# Patient Record
Sex: MALE | Race: WHITE | Employment: UNEMPLOYED | URBAN - METROPOLITAN AREA
[De-identification: names, ages, dates, MRNs, and addresses within clinical notes are randomized per-mention and may not be internally consistent; named-entity substitution may affect disease eponyms.]

---

## 2021-05-01 ENCOUNTER — APPOINTMENT (EMERGENCY)
Dept: CT IMAGING | Facility: HOSPITAL | Age: 31
End: 2021-05-01
Payer: COMMERCIAL

## 2021-05-01 ENCOUNTER — HOSPITAL ENCOUNTER (EMERGENCY)
Facility: HOSPITAL | Age: 31
Discharge: HOME | End: 2021-05-01
Attending: EMERGENCY MEDICINE | Admitting: EMERGENCY MEDICINE
Payer: COMMERCIAL

## 2021-05-01 VITALS
TEMPERATURE: 97.7 F | HEART RATE: 77 BPM | RESPIRATION RATE: 18 BRPM | SYSTOLIC BLOOD PRESSURE: 127 MMHG | OXYGEN SATURATION: 98 % | WEIGHT: 170 LBS | DIASTOLIC BLOOD PRESSURE: 84 MMHG

## 2021-05-01 DIAGNOSIS — K21.00 REFLUX ESOPHAGITIS: ICD-10-CM

## 2021-05-01 DIAGNOSIS — K29.00 ACUTE GASTRITIS: Primary | ICD-10-CM

## 2021-05-01 DIAGNOSIS — R11.2 NAUSEA AND VOMITING: ICD-10-CM

## 2021-05-01 LAB
ALBUMIN SERPL BCP-MCNC: 4.3 G/DL (ref 3.5–5)
ALP SERPL-CCNC: 72 U/L (ref 46–116)
ALT SERPL W P-5'-P-CCNC: 29 U/L (ref 12–78)
ANION GAP SERPL CALCULATED.3IONS-SCNC: 10 MMOL/L (ref 4–13)
AST SERPL W P-5'-P-CCNC: 20 U/L (ref 5–45)
BASOPHILS # BLD AUTO: 0.02 THOUSANDS/ΜL (ref 0–0.1)
BASOPHILS NFR BLD AUTO: 0 % (ref 0–1)
BILIRUB DIRECT SERPL-MCNC: 0.13 MG/DL (ref 0–0.2)
BILIRUB SERPL-MCNC: 0.37 MG/DL (ref 0.2–1)
BUN SERPL-MCNC: 20 MG/DL (ref 5–25)
CALCIUM SERPL-MCNC: 8.6 MG/DL (ref 8.3–10.1)
CHLORIDE SERPL-SCNC: 100 MMOL/L (ref 100–108)
CO2 SERPL-SCNC: 28 MMOL/L (ref 21–32)
CREAT SERPL-MCNC: 0.91 MG/DL (ref 0.6–1.3)
EOSINOPHIL # BLD AUTO: 0.04 THOUSAND/ΜL (ref 0–0.61)
EOSINOPHIL NFR BLD AUTO: 0 % (ref 0–6)
ERYTHROCYTE [DISTWIDTH] IN BLOOD BY AUTOMATED COUNT: 11.8 % (ref 11.6–15.1)
GFR SERPL CREATININE-BSD FRML MDRD: 112 ML/MIN/1.73SQ M
GLUCOSE SERPL-MCNC: 117 MG/DL (ref 65–140)
HCT VFR BLD AUTO: 44.7 % (ref 36.5–49.3)
HGB BLD-MCNC: 15.3 G/DL (ref 12–17)
IMM GRANULOCYTES # BLD AUTO: 0.04 THOUSAND/UL (ref 0–0.2)
IMM GRANULOCYTES NFR BLD AUTO: 0 % (ref 0–2)
LACTATE SERPL-SCNC: 1 MMOL/L (ref 0.5–2)
LIPASE SERPL-CCNC: 145 U/L (ref 73–393)
LYMPHOCYTES # BLD AUTO: 1.07 THOUSANDS/ΜL (ref 0.6–4.47)
LYMPHOCYTES NFR BLD AUTO: 9 % (ref 14–44)
MCH RBC QN AUTO: 30.1 PG (ref 26.8–34.3)
MCHC RBC AUTO-ENTMCNC: 34.2 G/DL (ref 31.4–37.4)
MCV RBC AUTO: 88 FL (ref 82–98)
MONOCYTES # BLD AUTO: 0.41 THOUSAND/ΜL (ref 0.17–1.22)
MONOCYTES NFR BLD AUTO: 3 % (ref 4–12)
NEUTROPHILS # BLD AUTO: 10.87 THOUSANDS/ΜL (ref 1.85–7.62)
NEUTS SEG NFR BLD AUTO: 88 % (ref 43–75)
NRBC BLD AUTO-RTO: 0 /100 WBCS
PLATELET # BLD AUTO: 235 THOUSANDS/UL (ref 149–390)
PMV BLD AUTO: 10.7 FL (ref 8.9–12.7)
POTASSIUM SERPL-SCNC: 3.7 MMOL/L (ref 3.5–5.3)
PROT SERPL-MCNC: 8.1 G/DL (ref 6.4–8.2)
RBC # BLD AUTO: 5.08 MILLION/UL (ref 3.88–5.62)
SODIUM SERPL-SCNC: 138 MMOL/L (ref 136–145)
WBC # BLD AUTO: 12.45 THOUSAND/UL (ref 4.31–10.16)

## 2021-05-01 PROCEDURE — 99285 EMERGENCY DEPT VISIT HI MDM: CPT | Performed by: PHYSICIAN ASSISTANT

## 2021-05-01 PROCEDURE — 99284 EMERGENCY DEPT VISIT MOD MDM: CPT

## 2021-05-01 PROCEDURE — 96376 TX/PRO/DX INJ SAME DRUG ADON: CPT

## 2021-05-01 PROCEDURE — 80076 HEPATIC FUNCTION PANEL: CPT | Performed by: PHYSICIAN ASSISTANT

## 2021-05-01 PROCEDURE — 96375 TX/PRO/DX INJ NEW DRUG ADDON: CPT

## 2021-05-01 PROCEDURE — 36415 COLL VENOUS BLD VENIPUNCTURE: CPT | Performed by: PHYSICIAN ASSISTANT

## 2021-05-01 PROCEDURE — 83605 ASSAY OF LACTIC ACID: CPT | Performed by: PHYSICIAN ASSISTANT

## 2021-05-01 PROCEDURE — 85025 COMPLETE CBC W/AUTO DIFF WBC: CPT | Performed by: PHYSICIAN ASSISTANT

## 2021-05-01 PROCEDURE — 80048 BASIC METABOLIC PNL TOTAL CA: CPT | Performed by: PHYSICIAN ASSISTANT

## 2021-05-01 PROCEDURE — 96361 HYDRATE IV INFUSION ADD-ON: CPT

## 2021-05-01 PROCEDURE — 83690 ASSAY OF LIPASE: CPT | Performed by: PHYSICIAN ASSISTANT

## 2021-05-01 PROCEDURE — 74177 CT ABD & PELVIS W/CONTRAST: CPT

## 2021-05-01 PROCEDURE — 96374 THER/PROPH/DIAG INJ IV PUSH: CPT

## 2021-05-01 PROCEDURE — C9113 INJ PANTOPRAZOLE SODIUM, VIA: HCPCS | Performed by: PHYSICIAN ASSISTANT

## 2021-05-01 RX ORDER — DICYCLOMINE HCL 20 MG
20 TABLET ORAL ONCE
Status: COMPLETED | OUTPATIENT
Start: 2021-05-01 | End: 2021-05-01

## 2021-05-01 RX ORDER — MORPHINE SULFATE 4 MG/ML
4 INJECTION, SOLUTION INTRAMUSCULAR; INTRAVENOUS ONCE
Status: COMPLETED | OUTPATIENT
Start: 2021-05-01 | End: 2021-05-01

## 2021-05-01 RX ORDER — FAMOTIDINE 20 MG/1
20 TABLET, FILM COATED ORAL 2 TIMES DAILY
Qty: 30 TABLET | Refills: 0 | Status: SHIPPED | OUTPATIENT
Start: 2021-05-01

## 2021-05-01 RX ORDER — OXYCODONE HYDROCHLORIDE AND ACETAMINOPHEN 5; 325 MG/1; MG/1
1 TABLET ORAL EVERY 6 HOURS PRN
Qty: 15 TABLET | Refills: 0 | Status: SHIPPED | OUTPATIENT
Start: 2021-05-01 | End: 2021-05-04

## 2021-05-01 RX ORDER — ONDANSETRON 4 MG/1
4 TABLET, ORALLY DISINTEGRATING ORAL EVERY 6 HOURS PRN
Qty: 20 TABLET | Refills: 0 | Status: SHIPPED | OUTPATIENT
Start: 2021-05-01

## 2021-05-01 RX ORDER — HYDROMORPHONE HCL/PF 1 MG/ML
0.5 SYRINGE (ML) INJECTION ONCE
Status: COMPLETED | OUTPATIENT
Start: 2021-05-01 | End: 2021-05-01

## 2021-05-01 RX ORDER — OMEPRAZOLE 20 MG/1
20 CAPSULE, DELAYED RELEASE ORAL DAILY
Qty: 30 CAPSULE | Refills: 0 | Status: SHIPPED | OUTPATIENT
Start: 2021-05-01

## 2021-05-01 RX ORDER — ONDANSETRON 2 MG/ML
4 INJECTION INTRAMUSCULAR; INTRAVENOUS ONCE
Status: COMPLETED | OUTPATIENT
Start: 2021-05-01 | End: 2021-05-01

## 2021-05-01 RX ORDER — PANTOPRAZOLE SODIUM 40 MG/1
40 INJECTION, POWDER, FOR SOLUTION INTRAVENOUS ONCE
Status: COMPLETED | OUTPATIENT
Start: 2021-05-01 | End: 2021-05-01

## 2021-05-01 RX ADMIN — MORPHINE SULFATE 4 MG: 4 INJECTION INTRAVENOUS at 08:13

## 2021-05-01 RX ADMIN — IOHEXOL 100 ML: 350 INJECTION, SOLUTION INTRAVENOUS at 07:34

## 2021-05-01 RX ADMIN — ONDANSETRON 4 MG: 2 INJECTION INTRAMUSCULAR; INTRAVENOUS at 08:12

## 2021-05-01 RX ADMIN — PANTOPRAZOLE SODIUM 40 MG: 40 INJECTION, POWDER, FOR SOLUTION INTRAVENOUS at 09:26

## 2021-05-01 RX ADMIN — DICYCLOMINE HYDROCHLORIDE 20 MG: 20 TABLET ORAL at 06:43

## 2021-05-01 RX ADMIN — ONDANSETRON 4 MG: 2 INJECTION INTRAMUSCULAR; INTRAVENOUS at 06:43

## 2021-05-01 RX ADMIN — SODIUM CHLORIDE 1000 ML: 0.9 INJECTION, SOLUTION INTRAVENOUS at 06:45

## 2021-05-01 RX ADMIN — HYDROMORPHONE HYDROCHLORIDE 0.5 MG: 1 INJECTION, SOLUTION INTRAMUSCULAR; INTRAVENOUS; SUBCUTANEOUS at 09:19

## 2021-05-01 NOTE — ED NOTES
Discharge instructions reviewed, patient has no new complaints or concerns at time of discharge  Patient ambulated out of department, steady gait, vss  Patient accompanied out of department by his family        Shaneka Rush RN  05/01/21 7329

## 2021-05-01 NOTE — ED PROVIDER NOTES
History  Chief Complaint   Patient presents with    Abdominal Pain     Patient reports generalized abdominal pain that began 6 hours ago  Patient reports nausea and 1 episode of vomiting  31 y o  male with no significant past medical history presents to ED with chief complaint of nausea, vomiting and abdominal pain  Onset of symptoms reported as 6 hours ago  Location of symptoms reported as abdomen  Quality is reported as fullness and pressure  Severity is reported as moderate  Associated symptoms:  Positive for nausea  Positive vomiting  Denies fevers  Denies UTI symptoms  Denies urinary retention  Positive constipation  Modifying factors:  Tried drinking water, taking hot showers and walking to relieve pain without improvement  Context:  Patient states that he ate a whole pizza yesterday evening  He reports later yesterday evening he ate a slice of something different and after that he developed abdominal pressure  He developed vomiting this morning in addition to persistent abdominal pain  Denies prior similar episodes in the past   Denies any recent fall injury or trauma  Denies any past surgical history  No recent sick contacts  No recent spoiled food ingestions  Patient states he feels that he is constipated but is unable to move his bowels to relieve his symptoms  Reviewed past medical history and visits via EPIC:  No prior visits to this ed  History provided by:  Patient and relative   used: No    Abdominal Pain  Associated symptoms: constipation, nausea and vomiting    Associated symptoms: no chest pain, no chills, no cough, no diarrhea, no dysuria, no fatigue, no fever, no hematuria, no shortness of breath and no sore throat        None       History reviewed  No pertinent past medical history  History reviewed  No pertinent surgical history  History reviewed  No pertinent family history    I have reviewed and agree with the history as documented  E-Cigarette/Vaping     E-Cigarette/Vaping Substances     Social History     Tobacco Use    Smoking status: Never Smoker    Smokeless tobacco: Current User   Substance Use Topics    Alcohol use: Yes     Frequency: 2-4 times a month    Drug use: Not Currently       Review of Systems   Constitutional: Negative for activity change, appetite change, chills, diaphoresis, fatigue, fever and unexpected weight change  HENT: Negative for congestion, dental problem, drooling, ear discharge, ear pain, facial swelling, hearing loss, mouth sores, nosebleeds, postnasal drip, rhinorrhea, sinus pressure, sinus pain, sneezing, sore throat, tinnitus, trouble swallowing and voice change  Eyes: Negative for photophobia, pain, discharge, redness, itching and visual disturbance  Respiratory: Negative for cough, choking, chest tightness, shortness of breath, wheezing and stridor  Cardiovascular: Negative for chest pain, palpitations and leg swelling  Gastrointestinal: Positive for abdominal pain, constipation, nausea and vomiting  Negative for abdominal distention, anal bleeding, blood in stool, diarrhea and rectal pain  Endocrine: Negative for cold intolerance, heat intolerance, polydipsia, polyphagia and polyuria  Genitourinary: Negative for decreased urine volume, difficulty urinating, dysuria, flank pain, frequency, hematuria and urgency  Musculoskeletal: Negative for arthralgias, back pain, gait problem, joint swelling, myalgias, neck pain and neck stiffness  Skin: Negative for color change, pallor, rash and wound  Allergic/Immunologic: Negative for environmental allergies, food allergies and immunocompromised state  Neurological: Negative for dizziness, tremors, seizures, syncope, facial asymmetry, speech difficulty, weakness, light-headedness, numbness and headaches  Hematological: Negative for adenopathy  Does not bruise/bleed easily     Psychiatric/Behavioral: Negative for agitation, confusion and hallucinations  The patient is not nervous/anxious  All other systems reviewed and are negative  Physical Exam  Physical Exam  Vitals signs and nursing note reviewed  Constitutional:       Appearance: Normal appearance  He is normal weight  Comments: /89 (BP Location: Left arm)   Pulse 78   Temp 97 7 °F (36 5 °C) (Oral)   Resp 18   Wt 77 1 kg (170 lb)   SpO2 99%      HENT:      Head: Normocephalic and atraumatic  Right Ear: External ear normal       Left Ear: External ear normal       Nose: Nose normal  No congestion or rhinorrhea  Mouth/Throat:      Mouth: Mucous membranes are moist    Eyes:      General: No scleral icterus  Right eye: No discharge  Left eye: No discharge  Extraocular Movements: Extraocular movements intact  Conjunctiva/sclera: Conjunctivae normal    Neck:      Musculoskeletal: Normal range of motion and neck supple  No neck rigidity or muscular tenderness  Cardiovascular:      Rate and Rhythm: Normal rate  Pulses: Normal pulses  Pulmonary:      Effort: Pulmonary effort is normal  No respiratory distress  Abdominal:      General: Abdomen is flat  There is no distension  Tenderness: There is abdominal tenderness  There is no guarding or rebound  Hernia: No hernia is present  Comments: Tenderness to palpation to left and right lower quadrants of abdomen  No rigidity or guarding  No pulsatile masses  Musculoskeletal: Normal range of motion  General: No swelling, deformity or signs of injury  Right lower leg: No edema  Left lower leg: No edema  Skin:     Coloration: Skin is not jaundiced  Findings: No bruising, erythema or rash  Neurological:      General: No focal deficit present  Mental Status: He is alert and oriented to person, place, and time  Mental status is at baseline  Motor: No weakness        Gait: Gait normal    Psychiatric:         Mood and Affect: Mood normal          Behavior: Behavior normal          Thought Content: Thought content normal          Judgment: Judgment normal          Vital Signs  ED Triage Vitals   Temperature Pulse Respirations Blood Pressure SpO2   05/01/21 0557 05/01/21 0557 05/01/21 0557 05/01/21 0557 05/01/21 0557   97 7 °F (36 5 °C) 78 18 139/89 99 %      Temp Source Heart Rate Source Patient Position - Orthostatic VS BP Location FiO2 (%)   05/01/21 0557 05/01/21 0557 05/01/21 0557 05/01/21 0557 --   Oral Monitor Lying Left arm       Pain Score       05/01/21 0813       9           Vitals:    05/01/21 0557 05/01/21 0815   BP: 139/89 127/84   Pulse: 78 77   Patient Position - Orthostatic VS: Lying Lying         Visual Acuity      ED Medications  Medications   sodium chloride 0 9 % bolus 1,000 mL (0 mL Intravenous Stopped 5/1/21 0919)   ondansetron (ZOFRAN) injection 4 mg (4 mg Intravenous Given 5/1/21 0643)   dicyclomine (BENTYL) tablet 20 mg (20 mg Oral Given 5/1/21 0643)   iohexol (OMNIPAQUE) 350 MG/ML injection (SINGLE-DOSE) 100 mL (100 mL Intravenous Given 5/1/21 0734)   morphine (PF) 4 mg/mL injection 4 mg (4 mg Intravenous Given 5/1/21 0813)   ondansetron (ZOFRAN) injection 4 mg (4 mg Intravenous Given 5/1/21 0812)   HYDROmorphone (DILAUDID) injection 0 5 mg (0 5 mg Intravenous Given 5/1/21 0919)   pantoprazole (PROTONIX) injection 40 mg (40 mg Intravenous Given 5/1/21 0926)       Diagnostic Studies  Results Reviewed     Procedure Component Value Units Date/Time    Lactic acid [[id number]]  (Normal) Collected: 05/01/21 0640    Lab Status: Final result Specimen: Blood from Arm, Right Updated: 05/01/21 0739     LACTIC ACID 1 0 mmol/L     Narrative:      Result may be elevated if tourniquet was used during collection      Lipase [[id number]]  (Normal) Collected: 05/01/21 0640    Lab Status: Final result Specimen: Blood from Arm, Right Updated: 05/01/21 0704     Lipase 145 u/L     Basic metabolic panel [[id number]] Collected: 05/01/21 9350    Lab Status: Final result Specimen: Blood from Arm, Right Updated: 05/01/21 0704     Sodium 138 mmol/L      Potassium 3 7 mmol/L      Chloride 100 mmol/L      CO2 28 mmol/L      ANION GAP 10 mmol/L      BUN 20 mg/dL      Creatinine 0 91 mg/dL      Glucose 117 mg/dL      Calcium 8 6 mg/dL      eGFR 112 ml/min/1 73sq m     Narrative:      [place] guidelines for Chronic Kidney Disease (CKD):     Stage 1 with normal or high GFR (GFR > 90 mL/min/1 73 square meters)    Stage 2 Mild CKD (GFR = 60-89 mL/min/1 73 square meters)    Stage 3A Moderate CKD (GFR = 45-59 mL/min/1 73 square meters)    Stage 3B Moderate CKD (GFR = 30-44 mL/min/1 73 square meters)    Stage 4 Severe CKD (GFR = 15-29 mL/min/1 73 square meters)    Stage 5 End Stage CKD (GFR <15 mL/min/1 73 square meters)  Note: GFR calculation is accurate only with a steady state creatinine    Hepatic function panel [[id number]]  (Normal) Collected: 05/01/21 0640    Lab Status: Final result Specimen: Blood from Arm, Right Updated: 05/01/21 0704     Total Bilirubin 0 37 mg/dL      Bilirubin, Direct 0 13 mg/dL      Alkaline Phosphatase 72 U/L      AST 20 U/L      ALT 29 U/L      Total Protein 8 1 g/dL      Albumin 4 3 g/dL     CBC and differential [[id number]]  (Abnormal) Collected: 05/01/21 0640    Lab Status: Final result Specimen: Blood from Arm, Right Updated: 05/01/21 0648     WBC 12 45 Thousand/uL      RBC 5 08 Million/uL      Hemoglobin 15 3 g/dL      Hematocrit 44 7 %      MCV 88 fL      MCH 30 1 pg      MCHC 34 2 g/dL      RDW 11 8 %      MPV 10 7 fL      Platelets 390 Thousands/uL      nRBC 0 /100 WBCs      Neutrophils Relative 88 %      Immat GRANS % 0 %      Lymphocytes Relative 9 %      Monocytes Relative 3 %      Eosinophils Relative 0 %      Basophils Relative 0 %      Neutrophils Absolute 10 87 Thousands/µL      Immature Grans Absolute 0 04 Thousand/uL      Lymphocytes Absolute 1 07 Thousands/µL      Monocytes Absolute 0 41 Thousand/µL      Eosinophils Absolute 0 04 Thousand/µL      Basophils Absolute 0 02 Thousands/µL     UA w Reflex to Microscopic w Reflex to Culture [[id number]]     Lab Status: No result Specimen: Urine                  CT abdomen pelvis with contrast   Final Result by Barbra Jasso DO (05/01 4253)   1  Distended gallbladder despite the nonfasting state of the patient  No CT evidence for acute cholecystitis  Correlate for gallbladder dysfunction  Consider follow-up nuclear medicine HIDA scan with gallbladder ejection fraction, when medically    stable  2   Hiatal hernia with reflux into the distal esophagus  Thickening of the distal esophagus  Correlate for reflux esophagitis  Recommend follow-up GI consultation and/or upper endoscopy  3   Distended, debris-filled stomach, likely related to the nonfasting state of the patient  Correlate for gastritis versus gastroparesis  Recommend follow-up GI consultation and/or follow-up gastric emptying study  The study was marked in [facility] for immediate notification  Workstation performed: [id number]                    Procedures  Procedures         ED Course  ED Course as of May 01 1000   Sat May 01, 2021   9240 Lab results reviewed  Lipase normal at 145  No pancreatitis  Hepatic function panel reviewed, AST of 20 ALT of 29 are normal   No transaminitis  CBC demonstrates mildly elevated white blood cell count 12 4, hemoglobin 15 3 hematocrit 44 7 are normal   No anemia basic metabolic panel demonstrates a BUN of 20 and creatinine 0 9 which are normal   No renal failure                                                MDM  Number of Diagnoses or Management Options  Acute gastritis: new and requires workup  Nausea and vomiting: new and requires workup  Reflux esophagitis: new and requires workup  Diagnosis management comments: Differential diagnosis includes but is not limited to appendicitis, diverticulitis, gastroenteritis, gastritis, cholecystitis, pancreatitis, mesenteric adenitis, kidney stone, pyelonephritis, UTI  Plan workup including labs, ct scan abd/pelvis    Ct abd/pelvis images independently visualized by me  Radiology report reviewed: FINDINGS:   ABDOMEN   LOWER CHEST:  No clinically significant abnormality identified in the visualized lower chest          LIVER/BILIARY TREE:  Unremarkable  GALLBLADDER: Distended, despite the nonfasting state of the patient   No calcified gallstones  No pericholecystic inflammatory change  SPLEEN:  Mildly enlarged  PANCREAS:  Unremarkable  ADRENAL GLANDS:  Unremarkable  KIDNEYS/URETERS:  No hydronephrosis or urinary tract calculus   One or more sharply circumscribed subcentimeter renal hypodensities are present, too small to accurately characterize, and statistically most likely benign findings  According to recent   literature (Radiology 2019) no further workup of these findings is recommended  STOMACH AND BOWEL:     The stomach is markedly distended and filled with ingested food products and air        Hiatal hernia   Reflux into the distal esophagus   Thickening of the distal esophagus  Fecalization of distal small bowel contents, compatible with delayed small bowel transit   No evidence of small bowel obstruction  The colon is segmentally distended with feces   Normal fecal burden   Scattered diverticula throughout the descending colon and sigmoid colon   Nothing to suggest acute diverticulitis  APPENDIX:  No findings to suggest appendicitis  ABDOMINOPELVIC CAVITY:  No ascites   No pneumoperitoneum   No lymphadenopathy  VESSELS:  Unremarkable for patient's age  PELVIS   REPRODUCTIVE ORGANS:  Unremarkable for patient's age  URINARY BLADDER:  Unremarkable  ABDOMINAL WALL/INGUINAL REGIONS:  Small umbilical hernia containing fat  OSSEOUS STRUCTURES:  No acute fracture or destructive osseous lesion   Spinal degenerative changes are noted   This is most pronounced L5-S1  Impression:    1   Distended gallbladder despite the nonfasting state of the patient   No CT evidence for acute cholecystitis   Correlate for gallbladder dysfunction   Consider follow-up nuclear medicine HIDA scan with gallbladder ejection fraction, when medically   stable  2   Hiatal hernia with reflux into the distal esophagus   Thickening of the distal esophagus   Correlate for reflux esophagitis   Recommend follow-up GI consultation and/or upper endoscopy  3   Distended, debris-filled stomach, likely related to the nonfasting state of the patient   Correlate for gastritis versus gastroparesis   Recommend follow-up GI consultation and/or follow-up gastric emptying study  Amount and/or Complexity of Data Reviewed  Clinical lab tests: ordered and reviewed  Tests in the radiology section of CPT®: ordered and reviewed  Discussion of test results with the performing providers: yes  Obtain history from someone other than the patient: yes (Relative at bedside  )  Review and summarize past medical records: yes  Independent visualization of images, tracings, or specimens: yes    Risk of Complications, Morbidity, and/or Mortality  General comments: ED course:  65-year-old male presents the emergency department with chief complaint of abdominal pain vomiting starting after eating a large pizza by himself last night  Reviewed all lab results with patient at bedside  No renal failure, no transaminitis or pancreatitis  CT scan shows findings consistent with hiatal hernia, reflux esophagitis, and debris-filled stomach with changes concerning for gastritis  Distended gallbladder but no findings to suggest acute cholecystitis  Discussed all test results including abnormalities of the CT scan with patient at bedside  Patient's pain improved with dilaudid  No vomiting while here in ED      Discussed symptoms appear most consistent with gastritis  Discussed diagnosis and expectant coarse of gastritis  Discussed gastritis diet  Discussed use of famotidine, omeprazole for acid reduction  Discussed use of zofran for nausea/vomiting  Discussed use of percocet for pain  Standard narcotic precautions given  Discussed follow up with primary care physician and Gastroenterology in 3-5 days for recheck and further evaluation of symptoms  Reviewed reasons to return to ed  Patient verbalized understanding of diagnosis and agreement with discharge plan of care as well as understanding of reasons to return to ed  I have reasonably determine that electronically prescribing a controlled substance would be impractical for the patient to obtain the controlled substance prescribed by electronic prescription or would cause an untimely delay resulting in an adverse impact on the patient's medical condition        Patient was seen during the outbreak of the corona virus epidemic   Resources are limited due to the severity of patient illnesses associated with virus   Testing is also limited at this time   Discussed with patient at the time of this evaluation   Due to the fact that limited resources are available -treatment options are limited  Patient Progress  Patient progress: improved      Disposition  Final diagnoses:   Acute gastritis   Nausea and vomiting   Reflux esophagitis     Time reflects when diagnosis was documented in both MDM as applicable and the Disposition within this note     Time User Action Codes Description Comment    5/1/2021  9:32 AM Everet Ohm Add [K29 00] Acute gastritis     5/1/2021  9:32 AM Everet Ohm Add [R11 2] Nausea and vomiting     5/1/2021  9:33 AM Everet Ohm Add [K21 00] Reflux esophagitis       ED Disposition     ED Disposition Condition Date/Time Comment    Discharge Stable Sat May 1, 2021  9:32 AM Arvind Lora discharge to home/self care              Follow-up Information     Follow up With Specialties Details Why Contact Info Additional [address] [facility] Emergency Department Emergency Medicine Go to  If symptoms worsen [address] [facility] [id number] [facility] Emergency Department, [address] [place], [place], [address] [place], [place] Family Medicine Call in 2 days for further evaluation of symptoms [address] [facility],[address]  [address] Gastroenterology Call in 2 days for further evaluation of esophagitis/hiatal hernia/gastritis symptoms [address] [facility]             Patient's Medications   Discharge Prescriptions    FAMOTIDINE (PEPCID) 20 MG TABLET    Take 1 tablet (20 mg total) by mouth 2 (two) times a day       Start Date: 5/1/2021  End Date: --       Order Dose: 20 mg       Quantity: 30 tablet    Refills: 0    OMEPRAZOLE (PRILOSEC) 20 MG DELAYED RELEASE CAPSULE    Take 1 capsule (20 mg total) by mouth daily       Start Date: 5/1/2021  End Date: --       Order Dose: 20 mg       Quantity: 30 capsule    Refills: 0    ONDANSETRON (ZOFRAN-ODT) 4 MG DISINTEGRATING TABLET    Take 1 tablet (4 mg total) by mouth every 6 (six) hours as needed for nausea or vomiting for up to 20 doses       Start Date: 5/1/2021  End Date: --       Order Dose: 4 mg       Quantity: 20 tablet    Refills: 0    OXYCODONE-ACETAMINOPHEN (PERCOCET) 5-325 MG PER TABLET    Take 1 tablet by mouth every 6 (six) hours as needed for severe pain (abd pain/dx gastritis/initial rx ) for up to 3 days Label no driving no etoh  Initial rx  Dx:Max Daily Amount: 4 tablets       Start Date: 5/1/2021  End Date: 5/4/2021       Order Dose: 1 tablet       Quantity: 15 tablet    Refills: 0     No discharge procedures on file      PDMP Review     None          ED Provider  Electronically Signed by           Seamus Miranda PA-C  05/01/21 1000